# Patient Record
Sex: MALE | Race: WHITE | Employment: UNEMPLOYED | RURAL
[De-identification: names, ages, dates, MRNs, and addresses within clinical notes are randomized per-mention and may not be internally consistent; named-entity substitution may affect disease eponyms.]

---
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
## 2020-04-29 ENCOUNTER — TELEPHONE (OUTPATIENT)
Dept: FAMILY MEDICINE CLINIC | Age: 20
End: 2020-04-29

## 2020-04-29 NOTE — TELEPHONE ENCOUNTER
Pt wants medication Venlasasine 50mg not on reorder list but wants sent over to [facility] in [address] [facility] [address]    Call pt at [phone]

## 2020-04-30 ENCOUNTER — VIRTUAL VISIT (OUTPATIENT)
Dept: FAMILY MEDICINE CLINIC | Age: 20
End: 2020-04-30

## 2020-04-30 DIAGNOSIS — F33.41 RECURRENT MAJOR DEPRESSIVE DISORDER, IN PARTIAL REMISSION (HCC): Primary | ICD-10-CM

## 2020-04-30 RX ORDER — VENLAFAXINE 50 MG/1
50 TABLET ORAL DAILY
Qty: 90 TAB | Refills: 1 | Status: SHIPPED | OUTPATIENT
Start: 2020-04-30 | End: 2020-12-29 | Stop reason: SDUPTHER

## 2020-04-30 NOTE — PROGRESS NOTES
CC:  Chief Complaint   Patient presents with    Medication Refill     Currently on Effexor and this has been working really well for him. Depression symptoms are better controlled at this time. Dominique Miramontes is a 21 y.o. male who was seen by synchronous (real-time) audio-video technology on 4/30/2020. Consent: Dominique Miramontes, who was seen by synchronous (real-time) audio-video technology, and/or his healthcare decision maker, is aware that this patient-initiated, Telehealth encounter on 4/30/2020 is a billable service, with coverage as determined by his insurance carrier. He is aware that he may receive a bill and has provided verbal consent to proceed: Yes. Assessment & Plan:   Diagnoses and all orders for this visit:    1. Recurrent major depressive disorder, in partial remission (HCC)  -     venlafaxine (EFFEXOR) 50 mg tablet; Take 1 Tab by mouth daily for 90 days. I spent at least 23 minutes on this visit with this established patient. ([id number])    Subjective:   Dominique Miramontes is a 21 y.o. male who was seen for Medication Refill (Currently on Effexor and this has been working really well for him. Depression symptoms are better controlled at this time. )      Prior to Admission medications    Medication Sig Start Date End Date Taking? Authorizing Provider   venlafaxine (EFFEXOR) 50 mg tablet Take 1 Tab by mouth daily for 90 days.  4/30/20 7/29/20 Yes Pratik Bravo MD     No Known Allergies    Patient Active Problem List   Diagnosis Code    Vitamin D deficiency E55.9     No Known Allergies  Past Medical History:   Diagnosis Date    Otitis media     Respiratory abnormalities     Tick bite summer 2010    Vitamin D deficiency 5/22/2019     Past Surgical History:   Procedure Laterality Date    HX CIRCUMCISION      HX WISDOM TEETH EXTRACTION       Family History   Problem Relation Age of Onset    Hypertension Brother     Diabetes Maternal Grandmother     Elevated Lipids Maternal Grandmother     Elevated Lipids Maternal Grandfather     Diabetes Paternal Grandmother      Social History     Tobacco Use    Smoking status: Never Smoker    Smokeless tobacco: Never Used   Substance Use Topics    Alcohol use: No       Review of Systems   Psychiatric/Behavioral: Positive for depression. Negative for hallucinations and suicidal ideas. The patient is not nervous/anxious. All other systems reviewed and are negative. Objective:   Vital Signs: (As obtained by patient/caregiver at home)  There were no vitals taken for this visit. General: alert, cooperative, no distress   Mental  status: normal mood, behavior, speech, dress, motor activity, and thought processes, able to follow commands   HENT: NCAT   Neck: no visualized mass   Resp: no respiratory distress   Neuro: no gross deficits   Skin: no discoloration or lesions of concern on visible areas   Psychiatric: normal affect, consistent with stated mood, no evidence of hallucinations     We discussed the expected course, resolution and complications of the diagnosis(es) in detail. Medication risks, benefits, costs, interactions, and alternatives were discussed as indicated. I advised him to contact the office if his condition worsens, changes or fails to improve as anticipated. He expressed understanding with the diagnosis(es) and plan. Naty Barraza is a 21 y.o. male who was evaluated by a video visit encounter for concerns as above. Patient identification was verified prior to start of the visit. A caregiver was present when appropriate. Due to this being a TeleHealth encounter (During [place] [address] public health emergency), evaluation of the following organ systems was limited: Vitals/Constitutional/EENT/Resp/CV/GI//MS/Neuro/Skin/Heme-Lymph-Imm.   Pursuant to the emergency declaration under the [facility] [address] waiver authority and the [place] Resources and Response Supplemental Appropriations Act, this Virtual  Visit was conducted, with patient's (and/or legal guardian's) consent, to reduce the patient's risk of exposure to COVID-19 and provide necessary medical care. Services were provided through a video synchronous discussion virtually to substitute for in-person clinic visit. Patient and provider were located at their individual homes. Follow-up and Dispositions    · Return in about 6 months (around 10/30/2020), or if symptoms worsen or fail to improve.        Salvatore Gonzalez MD

## 2020-12-29 ENCOUNTER — VIRTUAL VISIT (OUTPATIENT)
Dept: FAMILY MEDICINE CLINIC | Age: 20
End: 2020-12-29
Payer: COMMERCIAL

## 2020-12-29 DIAGNOSIS — F33.41 RECURRENT MAJOR DEPRESSIVE DISORDER, IN PARTIAL REMISSION (HCC): ICD-10-CM

## 2020-12-29 PROCEDURE — 99442 PR PHYS/QHP TELEPHONE EVALUATION 11-20 MIN: CPT | Performed by: INTERNAL MEDICINE

## 2020-12-29 RX ORDER — VENLAFAXINE 50 MG/1
50 TABLET ORAL DAILY
Qty: 90 TAB | Refills: 1 | Status: SHIPPED | OUTPATIENT
Start: 2020-12-29 | End: 2021-10-01

## 2020-12-29 NOTE — PROGRESS NOTES
Chief Complaint   Patient presents with    Follow-up    Depression         Ya Stevens is a 21 y.o. male who was seen by synchronous (real-time) AUDIO technology on 12/29/2020 for Follow-up and Depression        Assessment & Plan:   Diagnoses and all orders for this visit:    1. Recurrent major depressive disorder, in partial remission (HCC)  -     venlafaxine (EFFEXOR) 50 mg tablet; Take 1 Tab by mouth daily for 90 days. I spent at least 15 minutes on this visit with this established patient. Subjective:   20M with h/o depression who presents for follow up and refill of Effexor. He has found the medication helpful and useful and feels like his depression is better controlled. He is living with his mother. He has plans of maybe going to the [employer] or [employer] in the next year. He denies any worsening depression symptoms, SI/SI/SA. No worrisome side effects from the medication. Patient Active Problem List    Diagnosis Date Noted    Vitamin D deficiency 05/22/2019     Current Outpatient Medications   Medication Sig Dispense Refill    venlafaxine (EFFEXOR) 50 mg tablet Take 1 Tab by mouth daily for 90 days. 90 Tab 1     No Known Allergies  Past Medical History:   Diagnosis Date    Otitis media     Respiratory abnormalities     Tick bite summer 2010    Vitamin D deficiency 5/22/2019     Past Surgical History:   Procedure Laterality Date    HX CIRCUMCISION      HX WISDOM TEETH EXTRACTION       Family History   Problem Relation Age of Onset    Hypertension Brother     Diabetes Maternal Grandmother     Elevated Lipids Maternal Grandmother     Elevated Lipids Maternal Grandfather     Diabetes Paternal Grandmother      Social History     Tobacco Use    Smoking status: Never Smoker    Smokeless tobacco: Never Used   Substance Use Topics    Alcohol use: No       Review of Systems   Constitutional: Negative. HENT: Negative. Eyes: Negative. Respiratory: Negative. Cardiovascular: Negative. Gastrointestinal: Negative. Genitourinary: Negative. Musculoskeletal: Negative. Skin: Negative. Neurological: Negative. Endo/Heme/Allergies: Negative. Psychiatric/Behavioral: Negative. All other systems reviewed and are negative. Objective: There were no vitals taken for this visit. General: alert, cooperative, no distress   Mental  status: normal mood, behavior, speech thought processes, able to follow commands   Psychiatric: flat affect, consistent with stated mood, no evidence of hallucinations       We discussed the expected course, resolution and complications of the diagnosis(es) in detail. Medication risks, benefits, costs, interactions, and alternatives were discussed as indicated. I advised him to contact the office if his condition worsens, changes or fails to improve as anticipated. He expressed understanding with the diagnosis(es) and plan. Luciana Dewitt, who was evaluated through a patient-initiated, synchronous (real-time) audio-video encounter, and/or his healthcare decision maker, is aware that it is a billable service, with coverage as determined by his insurance carrier. He provided verbal consent to proceed: Yes, and patient identification was verified. It was conducted pursuant to the emergency declaration under the [address] [place] [address], [address] [place] [address] authority and the [employer] Act. A caregiver was present when appropriate. Ability to conduct physical exam was limited. I was in the office. The patient was at home. Follow-up and Dispositions    · Return if symptoms worsen or fail to improve, for Follow up 6 months for chronic issues, Follow up depression.           Jag Oreilly MD

## 2021-04-15 ENCOUNTER — APPOINTMENT (OUTPATIENT)
Dept: GENERAL RADIOLOGY | Age: 21
End: 2021-04-15
Attending: EMERGENCY MEDICINE
Payer: COMMERCIAL

## 2021-04-15 ENCOUNTER — HOSPITAL ENCOUNTER (EMERGENCY)
Age: 21
Discharge: HOME | End: 2021-04-15
Attending: EMERGENCY MEDICINE
Payer: COMMERCIAL

## 2021-04-15 VITALS
TEMPERATURE: 100.7 F | HEIGHT: 69 IN | HEART RATE: 119 BPM | OXYGEN SATURATION: 96 % | WEIGHT: 191.36 LBS | RESPIRATION RATE: 16 BRPM | DIASTOLIC BLOOD PRESSURE: 58 MMHG | SYSTOLIC BLOOD PRESSURE: 113 MMHG | BODY MASS INDEX: 28.34 KG/M2

## 2021-04-15 DIAGNOSIS — R50.83 FEVER AFTER COVID-19 VACCINATION: Primary | ICD-10-CM

## 2021-04-15 DIAGNOSIS — R07.9 CHEST PAIN, UNSPECIFIED TYPE: ICD-10-CM

## 2021-04-15 DIAGNOSIS — T50.B95A FEVER AFTER COVID-19 VACCINATION: Primary | ICD-10-CM

## 2021-04-15 LAB
ALBUMIN SERPL-MCNC: 3.9 G/DL (ref 3.5–5)
ALBUMIN/GLOB SERPL: 1.2 {RATIO} (ref 1.1–2.2)
ALP SERPL-CCNC: 97 U/L (ref 45–117)
ALT SERPL-CCNC: 34 U/L (ref 12–78)
ANION GAP SERPL CALC-SCNC: 9 MMOL/L (ref 5–15)
AST SERPL-CCNC: 22 U/L (ref 15–37)
BASOPHILS # BLD: 0 K/UL (ref 0–0.1)
BASOPHILS NFR BLD: 0 % (ref 0–1)
BILIRUB SERPL-MCNC: 0.6 MG/DL (ref 0.2–1)
BUN SERPL-MCNC: 13 MG/DL (ref 6–20)
BUN/CREAT SERPL: 14 (ref 12–20)
CALCIUM SERPL-MCNC: 9.1 MG/DL (ref 8.5–10.1)
CHLORIDE SERPL-SCNC: 101 MMOL/L (ref 97–108)
CO2 SERPL-SCNC: 27 MMOL/L (ref 21–32)
CREAT SERPL-MCNC: 0.9 MG/DL (ref 0.7–1.3)
DIFFERENTIAL METHOD BLD: NORMAL
EOSINOPHIL # BLD: 0 K/UL (ref 0–0.4)
EOSINOPHIL NFR BLD: 0 % (ref 0–7)
ERYTHROCYTE [DISTWIDTH] IN BLOOD BY AUTOMATED COUNT: 12.2 % (ref 11.5–14.5)
GLOBULIN SER CALC-MCNC: 3.2 G/DL (ref 2–4)
GLUCOSE SERPL-MCNC: 104 MG/DL (ref 65–100)
HCT VFR BLD AUTO: 42 % (ref 36.6–50.3)
HGB BLD-MCNC: 14.2 G/DL (ref 12.1–17)
IMM GRANULOCYTES # BLD AUTO: 0 K/UL
IMM GRANULOCYTES NFR BLD AUTO: 0 %
LYMPHOCYTES # BLD: 0.8 K/UL (ref 0.8–3.5)
LYMPHOCYTES NFR BLD: 13 % (ref 12–49)
MCH RBC QN AUTO: 30.8 PG (ref 26–34)
MCHC RBC AUTO-ENTMCNC: 33.8 G/DL (ref 30–36.5)
MCV RBC AUTO: 91.1 FL (ref 80–99)
MONOCYTES # BLD: 0.7 K/UL (ref 0–1)
MONOCYTES NFR BLD: 11 % (ref 5–13)
NEUTS BAND NFR BLD MANUAL: 1 % (ref 0–6)
NEUTS SEG # BLD: 4.6 K/UL (ref 1.8–8)
NEUTS SEG NFR BLD: 75 % (ref 32–75)
NRBC # BLD: 0 K/UL (ref 0–0.01)
NRBC BLD-RTO: 0 PER 100 WBC
PLATELET # BLD AUTO: 181 K/UL (ref 150–400)
PMV BLD AUTO: 9.4 FL (ref 8.9–12.9)
POTASSIUM SERPL-SCNC: 3.9 MMOL/L (ref 3.5–5.1)
PROT SERPL-MCNC: 7.1 G/DL (ref 6.4–8.2)
RBC # BLD AUTO: 4.61 M/UL (ref 4.1–5.7)
RBC MORPH BLD: NORMAL
SARS-COV-2, COV2: NORMAL
SODIUM SERPL-SCNC: 137 MMOL/L (ref 136–145)
TROPONIN I SERPL-MCNC: <0.05 NG/ML
WBC # BLD AUTO: 6.1 K/UL (ref 4.1–11.1)
WBC MORPH BLD: NORMAL

## 2021-04-15 PROCEDURE — 36415 COLL VENOUS BLD VENIPUNCTURE: CPT

## 2021-04-15 PROCEDURE — 84484 ASSAY OF TROPONIN QUANT: CPT

## 2021-04-15 PROCEDURE — 71045 X-RAY EXAM CHEST 1 VIEW: CPT

## 2021-04-15 PROCEDURE — 93005 ELECTROCARDIOGRAM TRACING: CPT

## 2021-04-15 PROCEDURE — U0003 INFECTIOUS AGENT DETECTION BY NUCLEIC ACID (DNA OR RNA); SEVERE ACUTE RESPIRATORY SYNDROME CORONAVIRUS 2 (SARS-COV-2) (CORONAVIRUS DISEASE [COVID-19]), AMPLIFIED PROBE TECHNIQUE, MAKING USE OF HIGH THROUGHPUT TECHNOLOGIES AS DESCRIBED BY CMS-2020-01-R: HCPCS

## 2021-04-15 PROCEDURE — 99285 EMERGENCY DEPT VISIT HI MDM: CPT

## 2021-04-15 PROCEDURE — 74011250637 HC RX REV CODE- 250/637: Performed by: EMERGENCY MEDICINE

## 2021-04-15 PROCEDURE — 85025 COMPLETE CBC W/AUTO DIFF WBC: CPT

## 2021-04-15 PROCEDURE — 80053 COMPREHEN METABOLIC PANEL: CPT

## 2021-04-15 PROCEDURE — 74011250636 HC RX REV CODE- 250/636: Performed by: EMERGENCY MEDICINE

## 2021-04-15 RX ORDER — DIPHENHYDRAMINE HCL 25 MG
50 TABLET ORAL
COMMUNITY

## 2021-04-15 RX ORDER — ACETAMINOPHEN 500 MG
1000 TABLET ORAL ONCE
Status: COMPLETED | OUTPATIENT
Start: 2021-04-15 | End: 2021-04-15

## 2021-04-15 RX ORDER — IBUPROFEN 600 MG/1
600 TABLET ORAL
Status: COMPLETED | OUTPATIENT
Start: 2021-04-15 | End: 2021-04-15

## 2021-04-15 RX ADMIN — IBUPROFEN 600 MG: 600 TABLET, FILM COATED ORAL at 17:57

## 2021-04-15 RX ADMIN — ACETAMINOPHEN 1000 MG: 500 TABLET, FILM COATED ORAL at 17:57

## 2021-04-15 RX ADMIN — SODIUM CHLORIDE 1000 ML: 9 INJECTION, SOLUTION INTRAVENOUS at 18:01

## 2021-04-15 NOTE — ED TRIAGE NOTES
Patient presents to the ER with complaints of chest pain that began at 0800. He states the \"severe pain has been intermittent but the tightness he has had throughout the day\". Patient states he got his second covid shot yesterday. States he feels like he has a fever.

## 2021-04-15 NOTE — ED PROVIDER NOTES
80-year-old male with a history of generalized anxiety disorder and depression on Effexor had his second dose of the Pfizer Covid vaccine yesterday and noticed some arm soreness and a headache last night. He woke up with chest pain, fever, nausea and vomiting. Has had a mild cough every now and then for the last few days, no change. No shortness of breath currently. He felt well before the vaccine. He has not taken any medications today. He also feels fatigued. No known Covid exposure. No loss of taste or smell. Past Medical History:   Diagnosis Date    Generalized anxiety disorder     Otitis media     Respiratory abnormalities     Severe depression (HCC)     Tick bite summer 2010    Vitamin D deficiency 5/22/2019       Past Surgical History:   Procedure Laterality Date    HX CIRCUMCISION      HX WISDOM TEETH EXTRACTION           Family History:   Problem Relation Age of Onset    Hypertension Brother     Diabetes Maternal Grandmother     Elevated Lipids Maternal Grandmother     Elevated Lipids Maternal Grandfather     Diabetes Paternal Grandmother        Social History     Socioeconomic History    Marital status: SINGLE     Spouse name: Not on file    Number of children: Not on file    Years of education: Not on file    Highest education level: Not on file   Occupational History    Not on file   Social Needs    Financial resource strain: Not on file    Food insecurity     Worry: Not on file     Inability: Not on file    Transportation needs     Medical: Not on file     Non-medical: Not on file   Tobacco Use    Smoking status: Never Smoker    Smokeless tobacco: Never Used   Substance and Sexual Activity    Alcohol use:  Yes    Drug use: Never    Sexual activity: Yes     Partners: Female     Birth control/protection: None   Lifestyle    Physical activity     Days per week: Not on file     Minutes per session: Not on file    Stress: Not on file   Relationships    Social connections     Talks on phone: Not on file     Gets together: Not on file     Attends Jainism service: Not on file     Active member of club or organization: Not on file     Attends meetings of clubs or organizations: Not on file     Relationship status: Not on file    Intimate partner violence     Fear of current or ex partner: Not on file     Emotionally abused: Not on file     Physically abused: Not on file     Forced sexual activity: Not on file   Other Topics Concern    Not on file   Social History Narrative    Not on file         ALLERGIES: Patient has no known allergies. Review of Systems   Constitutional: Positive for fatigue and fever. HENT: Positive for sore throat. Negative for trouble swallowing. Eyes: Negative for visual disturbance. Respiratory: Positive for cough. Cardiovascular: Positive for chest pain. Gastrointestinal: Positive for nausea and vomiting. Negative for abdominal pain and diarrhea. Genitourinary: Negative for difficulty urinating. Musculoskeletal: Negative for gait problem. Skin: Negative for rash. Neurological: Negative for headaches. Hematological: Does not bruise/bleed easily. Psychiatric/Behavioral: Negative for sleep disturbance. Vitals:    04/15/21 1707   BP: 127/71   Pulse: (!) 119   Resp: 16   Temp: (!) 100.7 °F (38.2 °C)   SpO2: 97%   Weight: 86.8 kg (191 lb 5.8 oz)   Height: 5' 9\" (1.753 m)            Physical Exam  Constitutional:       General: He is not in acute distress. Appearance: Normal appearance. He is ill-appearing. He is not toxic-appearing or diaphoretic. HENT:      Head: Normocephalic. Nose: Nose normal.      Mouth/Throat:      Mouth: Mucous membranes are moist.   Eyes:      Extraocular Movements: Extraocular movements intact. Conjunctiva/sclera: Conjunctivae normal.   Cardiovascular:      Rate and Rhythm: Tachycardia present. Pulmonary:      Effort: Pulmonary effort is normal. No respiratory distress. Abdominal:      General: Abdomen is flat. Musculoskeletal: Normal range of motion. Skin:     Findings: No rash. Neurological:      General: No focal deficit present. Mental Status: He is alert. Psychiatric:         Behavior: Behavior normal.          MDM  Number of Diagnoses or Management Options  Diagnosis management comments: EKG at 1705  Sinus tachycardia with normal axis at a rate of 112 bpm  NM, QRS, and QTc all within normal limits  No significant ST changes    The most likely explanation for this constellation of symptoms is an immune response to the vaccination yesterday, but it is also possible he may have contracted Covid just prior to being vaccinated and is now showing symptoms. I told him we would check for any Covid complications, like myocarditis, pneumonia, etc.  If negative, I will test him for Covid and have him go home and rest and take Tylenol and Motrin as needed. If the symptoms clear up in the next day or 2 then is this is most likely just an immune response, but if they continue or if he has a positive Covid test, then I made it clear to him that the symptoms could get worse and he would need to return should his chest pain get worse or should he develop shortness of breath or other concerning symptoms. This does not seem consistent with acute coronary syndrome. I do not believe he has a PE based on his lack of shortness of breath and based on the fact that the symptoms sprung up 1 day after vaccination.          Procedures

## 2021-04-16 ENCOUNTER — PATIENT OUTREACH (OUTPATIENT)
Dept: FAMILY MEDICINE CLINIC | Age: 21
End: 2021-04-16

## 2021-04-16 LAB
SARS-COV-2, XPLCVT: NOT DETECTED
SOURCE, COVRS: NORMAL

## 2021-04-17 LAB
ATRIAL RATE: 112 BPM
CALCULATED P AXIS, ECG09: 64 DEGREES
CALCULATED R AXIS, ECG10: 78 DEGREES
CALCULATED T AXIS, ECG11: 38 DEGREES
DIAGNOSIS, 93000: NORMAL
P-R INTERVAL, ECG05: 122 MS
Q-T INTERVAL, ECG07: 306 MS
QRS DURATION, ECG06: 88 MS
QTC CALCULATION (BEZET), ECG08: 417 MS
VENTRICULAR RATE, ECG03: 112 BPM

## 2021-09-30 DIAGNOSIS — F33.41 RECURRENT MAJOR DEPRESSIVE DISORDER, IN PARTIAL REMISSION (HCC): ICD-10-CM

## 2021-10-01 RX ORDER — VENLAFAXINE 50 MG/1
TABLET ORAL
Qty: 90 TABLET | Refills: 0 | Status: SHIPPED | OUTPATIENT
Start: 2021-10-01

## 2022-03-08 ENCOUNTER — TELEPHONE (OUTPATIENT)
Dept: FAMILY MEDICINE CLINIC | Age: 22
End: 2022-03-08

## 2022-03-19 PROBLEM — E55.9 VITAMIN D DEFICIENCY: Status: ACTIVE | Noted: 2019-05-22

## 2023-01-05 ENCOUNTER — APPOINTMENT (OUTPATIENT)
Dept: FAMILY MEDICINE CLINIC | Age: 23
End: 2023-01-05

## 2023-01-05 ENCOUNTER — OFFICE VISIT (OUTPATIENT)
Dept: FAMILY MEDICINE CLINIC | Age: 23
End: 2023-01-05
Payer: COMMERCIAL

## 2023-01-05 VITALS
OXYGEN SATURATION: 98 % | WEIGHT: 175.4 LBS | DIASTOLIC BLOOD PRESSURE: 72 MMHG | BODY MASS INDEX: 25.11 KG/M2 | TEMPERATURE: 98.9 F | HEIGHT: 70 IN | RESPIRATION RATE: 18 BRPM | SYSTOLIC BLOOD PRESSURE: 138 MMHG | HEART RATE: 108 BPM

## 2023-01-05 DIAGNOSIS — F41.1 GENERALIZED ANXIETY DISORDER: Primary | ICD-10-CM

## 2023-01-05 DIAGNOSIS — E78.2 MIXED HYPERLIPIDEMIA: ICD-10-CM

## 2023-01-05 DIAGNOSIS — F51.04 PSYCHOPHYSIOLOGICAL INSOMNIA: ICD-10-CM

## 2023-01-05 DIAGNOSIS — R53.81 MALAISE AND FATIGUE: ICD-10-CM

## 2023-01-05 DIAGNOSIS — E55.9 VITAMIN D DEFICIENCY: ICD-10-CM

## 2023-01-05 DIAGNOSIS — R53.83 MALAISE AND FATIGUE: ICD-10-CM

## 2023-01-05 PROCEDURE — 99213 OFFICE O/P EST LOW 20 MIN: CPT | Performed by: INTERNAL MEDICINE

## 2023-01-05 RX ORDER — BUSPIRONE HYDROCHLORIDE 5 MG/1
5 TABLET ORAL 2 TIMES DAILY
Qty: 60 TABLET | Refills: 0 | Status: SHIPPED | OUTPATIENT
Start: 2023-01-05

## 2023-01-05 NOTE — PROGRESS NOTES
Identified pt with two pt identifiers(name and ). Chief Complaint   Patient presents with    Anxiety     Patient has been having what feels like panic attacks for about a year. Health Maintenance Due   Topic    Hepatitis C Screening     Depression Screen     COVID-19 Vaccine (1)    DTaP/Tdap/Td series (1 - Tdap)    Flu Vaccine (1)       Wt Readings from Last 3 Encounters:   23 175 lb 6.4 oz (79.6 kg)   04/15/21 191 lb 5.8 oz (86.8 kg)   19 142 lb 9.6 oz (64.7 kg) (32 %, Z= -0.48)*     * Growth percentiles are based on CDC (Boys, 2-20 Years) data. Temp Readings from Last 3 Encounters:   23 98.9 °F (37.2 °C) (Temporal)   04/15/21 (!) 100.7 °F (38.2 °C)   19 98.2 °F (36.8 °C) (Oral)     BP Readings from Last 3 Encounters:   23 138/72   04/15/21 (!) 113/58   19 108/50     Pulse Readings from Last 3 Encounters:   23 (!) 108   04/15/21 (!) 119   19 91         Learning Assessment:  :     Learning Assessment 2019   PRIMARY LEARNER Patient   HIGHEST LEVEL OF EDUCATION - PRIMARY LEARNER  SOME COLLEGE   PRIMARY LANGUAGE ENGLISH   LEARNER PREFERENCE PRIMARY DEMONSTRATION   ANSWERED BY patient   RELATIONSHIP SELF       Depression Screening:  :     3 most recent PHQ Screens 2023   Little interest or pleasure in doing things Not at all   Feeling down, depressed, irritable, or hopeless Not at all   Total Score PHQ 2 0       Fall Risk Assessment:  :     No flowsheet data found. Abuse Screening:  :     Abuse Screening Questionnaire 2023   Do you ever feel afraid of your partner? N N   Are you in a relationship with someone who physically or mentally threatens you? N N   Is it safe for you to go home?  Y Y       Coordination of Care Questionnaire:  :     1) Have you been to an emergency room, urgent care clinic since your last visit? no   Hospitalized since your last visit? no             2) Have you seen or consulted any other health care providers outside of [address] [place] [address] since your last visit? no  (Include any pap smears or colon screenings in this section.)    3) Do you have an Advance Directive on file? no  Are you interested in receiving information about Advance Directives? no    Patient is accompanied by self I have received verbal consent from Roddy Reed to discuss any/all medical information while they are present in the room. 4.  For patients aged 39-70: Has the patient had a colonoscopy / FIT/ Cologuard? NA - based on age      If the patient is female:    11. For patients aged 41-77: Has the patient had a mammogram within the past 2 years? NA - based on age or sex      10. For patients aged 21-65: Has the patient had a pap smear?  NA - based on age or sex

## 2023-01-06 LAB
25(OH)D3+25(OH)D2 SERPL-MCNC: 8 NG/ML (ref 30–100)
ALBUMIN SERPL-MCNC: 4.8 G/DL (ref 4.1–5.2)
ALBUMIN/GLOB SERPL: 2 {RATIO} (ref 1.2–2.2)
ALP SERPL-CCNC: 117 IU/L (ref 44–121)
ALT SERPL-CCNC: 19 IU/L (ref 0–44)
AST SERPL-CCNC: 22 IU/L (ref 0–40)
BASOPHILS # BLD AUTO: 0 X10E3/UL (ref 0–0.2)
BASOPHILS NFR BLD AUTO: 1 %
BILIRUB SERPL-MCNC: 0.4 MG/DL (ref 0–1.2)
BUN SERPL-MCNC: 15 MG/DL (ref 6–20)
BUN/CREAT SERPL: 17 (ref 9–20)
CALCIUM SERPL-MCNC: 9.7 MG/DL (ref 8.7–10.2)
CHLORIDE SERPL-SCNC: 101 MMOL/L (ref 96–106)
CHOLEST SERPL-MCNC: 217 MG/DL (ref 100–199)
CO2 SERPL-SCNC: 24 MMOL/L (ref 20–29)
CREAT SERPL-MCNC: 0.86 MG/DL (ref 0.76–1.27)
EGFR: 126 ML/MIN/1.73
EOSINOPHIL # BLD AUTO: 0.1 X10E3/UL (ref 0–0.4)
EOSINOPHIL NFR BLD AUTO: 2 %
ERYTHROCYTE [DISTWIDTH] IN BLOOD BY AUTOMATED COUNT: 12.1 % (ref 11.6–15.4)
GLOBULIN SER CALC-MCNC: 2.4 G/DL (ref 1.5–4.5)
GLUCOSE SERPL-MCNC: 109 MG/DL (ref 70–99)
HCT VFR BLD AUTO: 48.3 % (ref 37.5–51)
HDLC SERPL-MCNC: 53 MG/DL
HGB BLD-MCNC: 16.2 G/DL (ref 13–17.7)
IMM GRANULOCYTES # BLD AUTO: 0 X10E3/UL (ref 0–0.1)
IMM GRANULOCYTES NFR BLD AUTO: 0 %
IMP & REVIEW OF LAB RESULTS: NORMAL
LDLC SERPL CALC-MCNC: 133 MG/DL (ref 0–99)
LYMPHOCYTES # BLD AUTO: 1.2 X10E3/UL (ref 0.7–3.1)
LYMPHOCYTES NFR BLD AUTO: 33 %
MCH RBC QN AUTO: 30.6 PG (ref 26.6–33)
MCHC RBC AUTO-ENTMCNC: 33.5 G/DL (ref 31.5–35.7)
MCV RBC AUTO: 91 FL (ref 79–97)
MONOCYTES # BLD AUTO: 0.3 X10E3/UL (ref 0.1–0.9)
MONOCYTES NFR BLD AUTO: 9 %
NEUTROPHILS # BLD AUTO: 2.1 X10E3/UL (ref 1.4–7)
NEUTROPHILS NFR BLD AUTO: 55 %
PLATELET # BLD AUTO: 241 X10E3/UL (ref 150–450)
POTASSIUM SERPL-SCNC: 4.4 MMOL/L (ref 3.5–5.2)
PROT SERPL-MCNC: 7.2 G/DL (ref 6–8.5)
RBC # BLD AUTO: 5.3 X10E6/UL (ref 4.14–5.8)
SODIUM SERPL-SCNC: 141 MMOL/L (ref 134–144)
TRIGL SERPL-MCNC: 173 MG/DL (ref 0–149)
TSH SERPL DL<=0.005 MIU/L-ACNC: 1.17 UIU/ML (ref 0.45–4.5)
VLDLC SERPL CALC-MCNC: 31 MG/DL (ref 5–40)
WBC # BLD AUTO: 3.8 X10E3/UL (ref 3.4–10.8)

## 2023-01-09 NOTE — PROGRESS NOTES
Chief Complaint   Patient presents with    Anxiety     Patient has been having what feels like panic attacks for about a year. Assessment/ Plan:   Diagnoses and all orders for this visit:    1. Generalized anxiety disorder  -     busPIRone (BUSPAR) 5 mg tablet; Take 1 Tablet by mouth two (2) times a day. 2. Psychophysiological insomnia   Will continue to monitor. He will be seeing a psychiatrist on 1/24/2023.     3. Mixed hyperlipidemia  -     METABOLIC PANEL, COMPREHENSIVE; Future  -     CBC WITH AUTOMATED DIFF; Future  -     LIPID PANEL; Future    4. Vitamin D deficiency  -     VITAMIN D, 25 HYDROXY; Future    5. Malaise and fatigue  -     THYROID CASCADE PROFILE; Future    I have discussed the diagnosis with the patient and the intended treatment plan as seen in the above orders. The patient has received an after-visit summary and questions were answered concerning future plans. Asked to return should symptoms worsen or not improve with treatment. Any pending labs and studies will be relayed to patient when they become available. Pt verbalizes understanding of plan of care and denies further questions or concerns at this time. Follow-up and Dispositions    Return in about 3 weeks (around 1/26/2023), or if symptoms worsen or fail to improve. Subjective:   Jayson Garner is a 25 y.o. male who presents today with c/o worsening anxiety. I recall that we had been treating him a few years ago for depression. We settled on Effexor which was working well for him. He did transfter to [facility]) but then dropped out. He is now working at [employer]. He has no plans to return to school at this time. Perhaps later. He denies SI/HI/SA. He has been off of medications for some time. He does have an appointment scheduled with a psychiatrist on 1/24/2023. HISTORICAL  PMH, PSH, FHX, SOCHX, ALLERGIES and MES were reviewed and updated today.       Review of Systems  Review of Systems   Psychiatric/Behavioral:  Positive for depression. The patient is nervous/anxious and has insomnia. All other systems reviewed and are negative. Objective:     Vitals:    01/05/23 1304   BP: 138/72   Pulse: (!) 108   Resp: 18   Temp: 98.9 °F (37.2 °C)   TempSrc: Temporal   SpO2: 98%   Weight: 175 lb 6.4 oz (79.6 kg)   Height: 5' 10\" (1.778 m)       Physical Exam  Vitals and nursing note reviewed. Constitutional:       Appearance: Normal appearance. HENT:      Head: Normocephalic and atraumatic. Mouth/Throat:      Mouth: Mucous membranes are moist.   Eyes:      Extraocular Movements: Extraocular movements intact. Conjunctiva/sclera: Conjunctivae normal.      Pupils: Pupils are equal, round, and reactive to light. Cardiovascular:      Rate and Rhythm: Normal rate and regular rhythm. Pulses: Normal pulses. Heart sounds: Normal heart sounds. Pulmonary:      Effort: Pulmonary effort is normal.      Breath sounds: Normal breath sounds. Musculoskeletal:      Cervical back: Normal range of motion and neck supple. Neurological:      General: No focal deficit present. Mental Status: He is alert. Mental status is at baseline. Cranial Nerves: No cranial nerve deficit. Sensory: No sensory deficit. Motor: No weakness. Coordination: Coordination normal.      Gait: Gait normal.      Deep Tendon Reflexes: Reflexes normal.   Psychiatric:         Mood and Affect: Mood normal.         Behavior: Behavior normal.         Thought Content:  Thought content normal.         Judgment: Judgment normal.       Alverto Armendariz MD  [address]   01/05/2023

## 2023-01-10 ENCOUNTER — TELEPHONE (OUTPATIENT)
Dept: FAMILY MEDICINE CLINIC | Age: 23
End: 2023-01-10

## 2023-01-10 NOTE — TELEPHONE ENCOUNTER
----- Message from Celena Manzano MD sent at 1/9/2023  8:42 PM EST -----  Pleas let patient know that his labs are stable/normal and show no worrisome concerns. He should consider taking Vitamin D 2000 international units daily (OTC). Cholesterol was a little elevated,but does not need medication. Work on low fat, low cholesterol foods. Follow up as discussed.

## 2023-01-10 NOTE — PROGRESS NOTES
Anshul let patient know that his labs are stable/normal and show no worrisome concerns. He should consider taking Vitamin D 2000 international units daily (OTC). Cholesterol was a little elevated,but does not need medication. Work on low fat, low cholesterol foods. Follow up as discussed.

## 2024-04-12 ENCOUNTER — OFFICE VISIT (OUTPATIENT)
Age: 24
End: 2024-04-12
Payer: COMMERCIAL

## 2024-04-12 VITALS
TEMPERATURE: 98.2 F | DIASTOLIC BLOOD PRESSURE: 60 MMHG | WEIGHT: 162.4 LBS | OXYGEN SATURATION: 99 % | HEIGHT: 70 IN | RESPIRATION RATE: 16 BRPM | BODY MASS INDEX: 23.25 KG/M2 | SYSTOLIC BLOOD PRESSURE: 116 MMHG | HEART RATE: 94 BPM

## 2024-04-12 DIAGNOSIS — Z13.220 SCREENING FOR LIPID DISORDERS: ICD-10-CM

## 2024-04-12 DIAGNOSIS — Z11.59 ENCOUNTER FOR HCV SCREENING TEST FOR LOW RISK PATIENT: ICD-10-CM

## 2024-04-12 DIAGNOSIS — Z23 ENCOUNTER FOR IMMUNIZATION: ICD-10-CM

## 2024-04-12 DIAGNOSIS — Z11.4 SCREENING FOR HIV (HUMAN IMMUNODEFICIENCY VIRUS): ICD-10-CM

## 2024-04-12 DIAGNOSIS — R22.31 SUBCUTANEOUS MASS OF RIGHT FOREARM: Primary | ICD-10-CM

## 2024-04-12 PROCEDURE — 90715 TDAP VACCINE 7 YRS/> IM: CPT | Performed by: FAMILY MEDICINE

## 2024-04-12 PROCEDURE — 99213 OFFICE O/P EST LOW 20 MIN: CPT | Performed by: FAMILY MEDICINE

## 2024-04-12 PROCEDURE — 90471 IMMUNIZATION ADMIN: CPT | Performed by: FAMILY MEDICINE

## 2024-04-12 SDOH — ECONOMIC STABILITY: FOOD INSECURITY: WITHIN THE PAST 12 MONTHS, YOU WORRIED THAT YOUR FOOD WOULD RUN OUT BEFORE YOU GOT MONEY TO BUY MORE.: NEVER TRUE

## 2024-04-12 SDOH — ECONOMIC STABILITY: HOUSING INSECURITY
IN THE LAST 12 MONTHS, WAS THERE A TIME WHEN YOU DID NOT HAVE A STEADY PLACE TO SLEEP OR SLEPT IN A SHELTER (INCLUDING NOW)?: NO

## 2024-04-12 SDOH — ECONOMIC STABILITY: FOOD INSECURITY: WITHIN THE PAST 12 MONTHS, THE FOOD YOU BOUGHT JUST DIDN'T LAST AND YOU DIDN'T HAVE MONEY TO GET MORE.: NEVER TRUE

## 2024-04-12 SDOH — ECONOMIC STABILITY: INCOME INSECURITY: HOW HARD IS IT FOR YOU TO PAY FOR THE VERY BASICS LIKE FOOD, HOUSING, MEDICAL CARE, AND HEATING?: NOT HARD AT ALL

## 2024-04-12 ASSESSMENT — PATIENT HEALTH QUESTIONNAIRE - PHQ9
SUM OF ALL RESPONSES TO PHQ QUESTIONS 1-9: 0
SUM OF ALL RESPONSES TO PHQ QUESTIONS 1-9: 0
SUM OF ALL RESPONSES TO PHQ9 QUESTIONS 1 & 2: 0
SUM OF ALL RESPONSES TO PHQ QUESTIONS 1-9: 0
2. FEELING DOWN, DEPRESSED OR HOPELESS: NOT AT ALL
SUM OF ALL RESPONSES TO PHQ QUESTIONS 1-9: 0
1. LITTLE INTEREST OR PLEASURE IN DOING THINGS: NOT AT ALL

## 2024-04-12 NOTE — PROGRESS NOTES
Identified pt with two pt identifiers(name and ).    Chief Complaint   Patient presents with    Mass     Pt is concerned about a bump on his right forearm.         Health Maintenance Due   Topic    Hepatitis B vaccine (1 of 3 - 3-dose series)    COVID-19 Vaccine (1)    Varicella vaccine (1 of 2 - 2-dose childhood series)    HPV vaccine (1 - Male 2-dose series)    HIV screen     Hepatitis C screen     DTaP/Tdap/Td vaccine (1 - Tdap)    Depression Screen        Wt Readings from Last 3 Encounters:   24 73.7 kg (162 lb 6.4 oz)   23 79.6 kg (175 lb 6.4 oz)   08/15/17 60.8 kg (134 lb) (31 %, Z= -0.50)*     * Growth percentiles are based on CDC (Boys, 2-20 Years) data.     Temp Readings from Last 3 Encounters:   24 98.2 °F (36.8 °C) (Temporal)     BP Readings from Last 3 Encounters:   24 116/60   23 138/72   08/15/17 124/68 (72 %, Z = 0.58 /  49 %, Z = -0.03)*     *BP percentiles are based on the 2017 AAP Clinical Practice Guideline for boys     Pulse Readings from Last 3 Encounters:   24 94   23 (!) 108   08/15/17 96           Depression Screening:  :         2024    11:45 AM 2023     1:00 PM   PHQ-9 Questionaire   Little interest or pleasure in doing things 0 0   Feeling down, depressed, or hopeless 0 0   PHQ-9 Total Score 0 0        Fall Risk Assessment:  :          No data to display                 Abuse Screening:  :          No data to display                 Coordination of Care Questionnaire:  :     \"Have you been to the ER, urgent care clinic since your last visit?  Hospitalized since your last visit?\"    NO    “Have you seen or consulted any other health care providers outside of [facility] since your last visit?”    NO

## 2024-04-12 NOTE — PROGRESS NOTES
[facility] [id number] [place] [address]  Phone: [phone]  Fax: [phone]        Chief Complaint   Patient presents with    Mass     Pt is concerned about a bump on his right forearm.      He is a 24 y.o. male who presents for acute visit.  Has a bump on his right forearm that has been there for about 1 year now.  Not painful, just notices it. Does not affect his function.  Does not get irritated, or itchy. Does not drain. No fever or systemic symptoms.    Prior to Visit Medications    Not on File       No Known Allergies      Reviewed PmHx, RxHx, FmHx, SocHx, AllgHx and updated and dated in the chart.      Objective:     Vitals:    04/12/24 1145   BP: 116/60   Site: Left Upper Arm   Position: Sitting   Cuff Size: Large Adult   Pulse: 94   Resp: 16   Temp: 98.2 °F (36.8 °C)   TempSrc: Temporal   SpO2: 99%   Weight: 73.7 kg (162 lb 6.4 oz)   Height: 1.778 m (5' 10\")     Physical Examination:    Physical Exam  Constitutional:       General: He is not in acute distress.     Appearance: Normal appearance.   Musculoskeletal:      Right forearm: Swelling (1cm cyst present on dorsal forearm.  Mobile. Not tender. No erythema.) present. No tenderness.   Neurological:      General: No focal deficit present.      Mental Status: He is alert and oriented to person, place, and time.      Cranial Nerves: No cranial nerve deficit.   Psychiatric:         Mood and Affect: Mood normal.         Behavior: Behavior normal.         No results found for this visit on 04/12/24.    Assessment/ Plan:   Jamar was seen today for mass.    Diagnoses and all orders for this visit:    Subcutaneous mass of right forearm    Encounter for immunization  -     Tdap, BOOSTRIX, (age 10 yrs+), IM    Encounter for HCV screening test for low risk patient  -     Hepatitis C Antibody; Future  -     Hepatitis C Antibody    Screening for HIV (human immunodeficiency virus)  -     HIV 1/2 Ag/Ab, 4TH Generation,W Rflx

## 2024-04-13 LAB
ALBUMIN SERPL-MCNC: 5 G/DL (ref 4.3–5.2)
ALBUMIN/GLOB SERPL: 2.3 {RATIO} (ref 1.2–2.2)
ALP SERPL-CCNC: 96 IU/L (ref 44–121)
ALT SERPL-CCNC: 26 IU/L (ref 0–44)
AST SERPL-CCNC: 29 IU/L (ref 0–40)
BILIRUB SERPL-MCNC: 0.5 MG/DL (ref 0–1.2)
BUN SERPL-MCNC: 19 MG/DL (ref 6–20)
BUN/CREAT SERPL: 24 (ref 9–20)
CALCIUM SERPL-MCNC: 10.2 MG/DL (ref 8.7–10.2)
CHLORIDE SERPL-SCNC: 104 MMOL/L (ref 96–106)
CHOLEST SERPL-MCNC: 200 MG/DL (ref 100–199)
CO2 SERPL-SCNC: 24 MMOL/L (ref 20–29)
CREAT SERPL-MCNC: 0.79 MG/DL (ref 0.76–1.27)
EGFRCR SERPLBLD CKD-EPI 2021: 127 ML/MIN/1.73
GLOBULIN SER CALC-MCNC: 2.2 G/DL (ref 1.5–4.5)
GLUCOSE SERPL-MCNC: 86 MG/DL (ref 70–99)
HCV IGG SERPL QL IA: NON REACTIVE
HDLC SERPL-MCNC: 53 MG/DL
HIV 1+2 AB+HIV1 P24 AG SERPL QL IA: NON REACTIVE
LDLC SERPL CALC-MCNC: 120 MG/DL (ref 0–99)
POTASSIUM SERPL-SCNC: 5.2 MMOL/L (ref 3.5–5.2)
PROT SERPL-MCNC: 7.2 G/DL (ref 6–8.5)
SODIUM SERPL-SCNC: 142 MMOL/L (ref 134–144)
TRIGL SERPL-MCNC: 155 MG/DL (ref 0–149)
VLDLC SERPL CALC-MCNC: 27 MG/DL (ref 5–40)

## 2024-04-14 LAB — IMP & REVIEW OF LAB RESULTS: NORMAL

## 2024-04-15 LAB — IMP & REVIEW OF LAB RESULTS: NORMAL

## 2024-07-07 ENCOUNTER — TELEPHONE (OUTPATIENT)
Facility: CLINIC | Age: 24
End: 2024-07-07

## 2024-07-07 NOTE — TELEPHONE ENCOUNTER
[facility] [place] [facility]     Received call from Jamar CARLOS Villaseñor at 6:03 PM     Subjective: Returned call to on-call number provided and reached patient's mother. She passed phone to patient.     Current Symptoms: Pt states he is having trouble breathing when going to sleep. He says sensation is difficult to describe but that it is \"annoying.\" He denies chest tightness or pain.      Onset: ongoing for a few months; however he just mentioned it to his mother who advised him to call.      Associated Symptoms: denies, possibly occurs more when stressed r/t work. Maybe sometimes alcohol is a trigger. Admits to anxiety symptoms and occasional panic attacks.      Pain Severity: none     Temperature:  none     What has been tried: nothing     Recommended disposition: As symptom has been ongoing x months, does not appear to be emergent/acute at this time. However he has not been seen in a few months and has no follow up on file with Dr. Foote. Discussed that patient should be seen to evaluate this symptom and discuss his anxiety/panic attacks. Advised pt to keep a log of his symptoms and that I will send a message to Dr. Foote to request an appointment on his behalf.     Patient agrees with plan, verbalizes understanding; denies any other questions or concerns; instructed to call back for any new or worsening symptoms.     Patient/Caller agrees with recommended disposition    FELIX Lazo - CNP  [address]  [address] [place] [address]  Tel: [phone]  Fax: [phone]

## 2024-07-10 ENCOUNTER — OFFICE VISIT (OUTPATIENT)
Age: 24
End: 2024-07-10
Payer: COMMERCIAL

## 2024-07-10 VITALS
HEART RATE: 75 BPM | HEIGHT: 70 IN | TEMPERATURE: 98.8 F | DIASTOLIC BLOOD PRESSURE: 67 MMHG | RESPIRATION RATE: 16 BRPM | WEIGHT: 159.2 LBS | BODY MASS INDEX: 22.79 KG/M2 | SYSTOLIC BLOOD PRESSURE: 139 MMHG | OXYGEN SATURATION: 98 %

## 2024-07-10 DIAGNOSIS — G47.8 SLEEP PARALYSIS: Primary | ICD-10-CM

## 2024-07-10 DIAGNOSIS — R06.89 GASPING FOR BREATH: ICD-10-CM

## 2024-07-10 PROCEDURE — 99213 OFFICE O/P EST LOW 20 MIN: CPT | Performed by: FAMILY MEDICINE

## 2024-07-10 RX ORDER — ERGOCALCIFEROL 1.25 MG/1
50000 CAPSULE ORAL WEEKLY
Qty: 4 CAPSULE | Refills: 1 | Status: SHIPPED | OUTPATIENT
Start: 2024-07-10

## 2024-07-10 NOTE — PROGRESS NOTES
Identified pt with two pt identifiers(name and ).    Chief Complaint   Patient presents with    Sleep Apnea     Patient would like to discuss sleep apnea as he has noticed right before he falls asleep he is having to catch his breath the past few months         Health Maintenance Due   Topic    Hepatitis B vaccine (1 of 3 - 3-dose series)    COVID-19 Vaccine (1)    HPV vaccine (1 - Male 2-dose series)    Varicella vaccine (2 of 2 - 2-dose childhood series)       Wt Readings from Last 3 Encounters:   24 73.7 kg (162 lb 6.4 oz)   23 79.6 kg (175 lb 6.4 oz)   08/15/17 60.8 kg (134 lb) (31 %, Z= -0.50)*     * Growth percentiles are based on CDC (Boys, 2-20 Years) data.     Temp Readings from Last 3 Encounters:   24 98.2 °F (36.8 °C) (Temporal)     BP Readings from Last 3 Encounters:   24 116/60   23 138/72   08/15/17 124/68 (72 %, Z = 0.58 /  49 %, Z = -0.03)*     *BP percentiles are based on the 2017 AAP Clinical Practice Guideline for boys     Pulse Readings from Last 3 Encounters:   24 94   23 (!) 108   08/15/17 96           Depression Screening:  :         2024    11:45 AM 2023     1:00 PM   PHQ-9 Questionaire   Little interest or pleasure in doing things 0 0   Feeling down, depressed, or hopeless 0 0   PHQ-9 Total Score 0 0        Fall Risk Assessment:  :          No data to display                 Abuse Screening:  :          No data to display                 Coordination of Care Questionnaire:  :     \"Have you been to the ER, urgent care clinic since your last visit?  Hospitalized since your last visit?\"    NO    “Have you seen or consulted any other health care providers outside of [facility] since your last visit?”    NO            Click Here for Release of Records Request

## 2024-07-11 ASSESSMENT — ENCOUNTER SYMPTOMS
SORE THROAT: 0
SHORTNESS OF BREATH: 0
EYE DISCHARGE: 0
COLOR CHANGE: 0
COUGH: 0
EYE ITCHING: 0
SINUS PAIN: 0
EYE PAIN: 0
CHEST TIGHTNESS: 0
BLOOD IN STOOL: 0
EYE REDNESS: 0
SINUS PRESSURE: 0
VOMITING: 0
NAUSEA: 0
RHINORRHEA: 0
CONSTIPATION: 0
DIARRHEA: 0
WHEEZING: 0
ABDOMINAL PAIN: 0

## 2024-07-11 NOTE — PROGRESS NOTES
Assessment & Plan   1. Sleep paralysis  -     [facility] - García Prabhakar MD, Sleep Medicine, [place]  -     vitamin D (ERGOCALCIFEROL) 1.25 MG (72360 UT) CAPS capsule; Take 1 capsule by mouth once a week, Disp-4 capsule, R-1Normal  2. Gasping for breath  -     [facility] - García Prabhakar MD, Sleep Medicine, [place]  -     vitamin D (ERGOCALCIFEROL) 1.25 MG (63134 UT) CAPS capsule; Take 1 capsule by mouth once a week, Disp-4 capsule, R-1Normal       No follow-ups on file.       Subjective   Jamar Villaseñor (:  2000) is a 24 y.o. male,Established patient, here for evaluation of the following chief complaint(s):  Sleep Apnea (Patient would like to discuss sleep apnea as he has noticed right before he falls asleep he is having to catch his breath the past few months )      Jamar Villaseñor is a 24 y.o. male presents with \"falling asleep and then gasping and waking up\"        Review of Systems   Constitutional:  Negative for activity change, appetite change, diaphoresis, fatigue, fever and unexpected weight change.   HENT:  Negative for congestion, nosebleeds, postnasal drip, rhinorrhea, sinus pressure, sinus pain, sneezing, sore throat and tinnitus.    Eyes:  Negative for pain, discharge, redness and itching.   Respiratory:  Negative for cough, chest tightness, shortness of breath and wheezing.    Cardiovascular:  Negative for chest pain, palpitations and leg swelling.   Gastrointestinal:  Negative for abdominal pain, blood in stool, constipation, diarrhea, nausea and vomiting.   Endocrine: Negative for cold intolerance, heat intolerance, polydipsia, polyphagia and polyuria.   Genitourinary:  Negative for decreased urine volume, dysuria, flank pain, frequency, hematuria and urgency.   Musculoskeletal:  Negative for arthralgias, gait problem, joint swelling, myalgias and neck stiffness.   Skin:  Negative for color change, rash and wound.   Allergic/Immunologic: Negative for environmental

## 2024-07-18 ENCOUNTER — TELEPHONE (OUTPATIENT)
Age: 24
End: 2024-07-18

## 2024-11-11 ENCOUNTER — OFFICE VISIT (OUTPATIENT)
Age: 24
End: 2024-11-11
Payer: COMMERCIAL

## 2024-11-11 VITALS
RESPIRATION RATE: 16 BRPM | HEIGHT: 70 IN | WEIGHT: 156 LBS | BODY MASS INDEX: 22.33 KG/M2 | OXYGEN SATURATION: 98 % | DIASTOLIC BLOOD PRESSURE: 64 MMHG | HEART RATE: 80 BPM | SYSTOLIC BLOOD PRESSURE: 134 MMHG | TEMPERATURE: 98 F

## 2024-11-11 DIAGNOSIS — G47.30 SLEEP-DISORDERED BREATHING: Primary | ICD-10-CM

## 2024-11-11 DIAGNOSIS — J30.2 SEASONAL ALLERGIC RHINITIS, UNSPECIFIED TRIGGER: ICD-10-CM

## 2024-11-11 PROCEDURE — 99214 OFFICE O/P EST MOD 30 MIN: CPT | Performed by: FAMILY MEDICINE

## 2024-11-11 RX ORDER — CETIRIZINE HYDROCHLORIDE 10 MG/1
10 TABLET ORAL DAILY
Qty: 30 TABLET | Refills: 2 | Status: SHIPPED | OUTPATIENT
Start: 2024-11-11

## 2024-11-11 RX ORDER — FLUTICASONE PROPIONATE 50 MCG
1 SPRAY, SUSPENSION (ML) NASAL DAILY
Qty: 1 EACH | Refills: 2 | Status: SHIPPED | OUTPATIENT
Start: 2024-11-11

## 2024-11-11 ASSESSMENT — PATIENT HEALTH QUESTIONNAIRE - PHQ9
8. MOVING OR SPEAKING SO SLOWLY THAT OTHER PEOPLE COULD HAVE NOTICED. OR THE OPPOSITE, BEING SO FIGETY OR RESTLESS THAT YOU HAVE BEEN MOVING AROUND A LOT MORE THAN USUAL: SEVERAL DAYS
SUM OF ALL RESPONSES TO PHQ QUESTIONS 1-9: 7
2. FEELING DOWN, DEPRESSED OR HOPELESS: SEVERAL DAYS
SUM OF ALL RESPONSES TO PHQ QUESTIONS 1-9: 7
3. TROUBLE FALLING OR STAYING ASLEEP: SEVERAL DAYS
7. TROUBLE CONCENTRATING ON THINGS, SUCH AS READING THE NEWSPAPER OR WATCHING TELEVISION: SEVERAL DAYS
10. IF YOU CHECKED OFF ANY PROBLEMS, HOW DIFFICULT HAVE THESE PROBLEMS MADE IT FOR YOU TO DO YOUR WORK, TAKE CARE OF THINGS AT HOME, OR GET ALONG WITH OTHER PEOPLE: SOMEWHAT DIFFICULT
SUM OF ALL RESPONSES TO PHQ QUESTIONS 1-9: 7
SUM OF ALL RESPONSES TO PHQ9 QUESTIONS 1 & 2: 2
9. THOUGHTS THAT YOU WOULD BE BETTER OFF DEAD, OR OF HURTING YOURSELF: NOT AT ALL
1. LITTLE INTEREST OR PLEASURE IN DOING THINGS: SEVERAL DAYS
6. FEELING BAD ABOUT YOURSELF - OR THAT YOU ARE A FAILURE OR HAVE LET YOURSELF OR YOUR FAMILY DOWN: SEVERAL DAYS
4. FEELING TIRED OR HAVING LITTLE ENERGY: SEVERAL DAYS
5. POOR APPETITE OR OVEREATING: NOT AT ALL
SUM OF ALL RESPONSES TO PHQ QUESTIONS 1-9: 7

## 2024-11-11 NOTE — PROGRESS NOTES
[facility]  [address] [place] [address]  Phone: [phone]  Fax: [phone]        Chief Complaint   Patient presents with    Sleep Problem     Pt c/o waking at night gasping for air. He states it feels like his sinuses are closing. This does not happen every night just at random times.      He is a 24 y.o. male who presents for acute visit.  Usual patient of Dr. Foote.    He is complaining of sleep issues.  Says that he feels his sinusese closing up and facial pressure when he is trying to sleep.  Will wake up gasping for air multiple times/night.  Can be particularly bad some nights where he is up multiple (5+ times).  Reports that this started ~6 months ago.    He is also complaining of increaed sinus/nasal congestion throughout the day.  There is no runny nose.  Has seasonal allergies--pollen in the spring.  He has tried a nasal spray for this--nothing helping as much.    Saw Dr. El in 7/2024 for similar.  He was referred for sleep evaluation.  He never made appointment with this group.    Prior to Visit Medications    Medication Sig Taking? Authorizing Provider   fluticasone (FLONASE) 50 MCG/ACT nasal spray 1 spray by Each Nostril route daily Yes Phill Sosa MD   cetirizine (ZYRTEC) 10 MG tablet Take 1 tablet by mouth daily Yes Phill Sosa MD   vitamin D (ERGOCALCIFEROL) 1.25 MG (03625 UT) CAPS capsule Take 1 capsule by mouth once a week Yes Amparo El MD       No Known Allergies      Reviewed PmHx, RxHx, FmHx, SocHx, AllgHx and updated and dated in the chart.      Objective:     Vitals:    11/11/24 1551   BP: 134/64   Site: Right Upper Arm   Position: Sitting   Cuff Size: Medium Adult   Pulse: 80   Resp: 16   Temp: 98 °F (36.7 °C)   TempSrc: Temporal   SpO2: 98%   Weight: 70.8 kg (156 lb)   Height: 1.778 m (5' 10\")     Physical Examination:    Physical Exam  Vitals and nursing note reviewed.   Constitutional:       General: He is not in acute distress.

## 2024-11-11 NOTE — PROGRESS NOTES
Identified pt with two pt identifiers(name and )    Chief Complaint   Patient presents with    Sleep Problem     Pt c/o waking at night gasping for air. He states it feels like his sinuses are closing. This does not happen every night just at random times.         Health Maintenance Due   Topic    Varicella vaccine (2 of 2 - 2-dose childhood series)    HPV vaccine (1 - Male 3-dose series)    Hepatitis B vaccine (1 of 3 - 19+ 3-dose series)    Flu vaccine (1)    COVID-19 Vaccine ( season)       Wt Readings from Last 3 Encounters:   24 70.8 kg (156 lb)   07/10/24 72.2 kg (159 lb 3.2 oz)   24 73.7 kg (162 lb 6.4 oz)     Temp Readings from Last 3 Encounters:   24 98 °F (36.7 °C) (Temporal)   07/10/24 98.8 °F (37.1 °C) (Temporal)   24 98.2 °F (36.8 °C) (Temporal)     BP Readings from Last 3 Encounters:   24 134/64   07/10/24 139/67   24 116/60     Pulse Readings from Last 3 Encounters:   24 80   07/10/24 75   24 94           Depression Screening:  :         2024     3:51 PM 2024    11:45 AM 2023     1:00 PM   PHQ-9 Questionaire   Little interest or pleasure in doing things 1 0 0   Feeling down, depressed, or hopeless 1 0 0   Trouble falling or staying asleep, or sleeping too much 1     Feeling tired or having little energy 1     Poor appetite or overeating 0     Feeling bad about yourself - or that you are a failure or have let yourself or your family down 1     Trouble concentrating on things, such as reading the newspaper or watching television 1     Moving or speaking so slowly that other people could have noticed. Or the opposite - being so fidgety or restless that you have been moving around a lot more than usual 1     Thoughts that you would be better off dead, or of hurting yourself in some way 0     PHQ-9 Total Score 7 0 0   If you checked off any problems, how difficult have these problems made it for you to do your work, take care of

## 2025-01-28 ENCOUNTER — OFFICE VISIT (OUTPATIENT)
Age: 25
End: 2025-01-28
Payer: COMMERCIAL

## 2025-01-28 VITALS
OXYGEN SATURATION: 99 % | SYSTOLIC BLOOD PRESSURE: 129 MMHG | TEMPERATURE: 99.1 F | HEART RATE: 80 BPM | HEIGHT: 70 IN | BODY MASS INDEX: 22.26 KG/M2 | DIASTOLIC BLOOD PRESSURE: 75 MMHG | WEIGHT: 155.5 LBS

## 2025-01-28 DIAGNOSIS — G47.30 SLEEP-DISORDERED BREATHING: Primary | ICD-10-CM

## 2025-01-28 PROCEDURE — 99203 OFFICE O/P NEW LOW 30 MIN: CPT | Performed by: SPECIALIST

## 2025-01-28 ASSESSMENT — SLEEP AND FATIGUE QUESTIONNAIRES
DO YOU GENERALLY HAVE DIFFICULTY REMEMBERING THINGS BECAUSE YOU ARE SLEEPY OR TIRED: YES, MODERATE
DO YOU HAVE DIFFICULTY VISITING YOUR FAMILY OR FRIENDS IN THEIR HOME BECAUSE YOU BECOME SLEEPY OR TIRED: YES, A LITTLE
DO YOU WORK SHIFTS: NO
DO YOU HAVE DIFFICULTY BEING AS ACTIVE AS YOU WANT TO BE IN THE MORNING BECAUSE YOU ARE SLEEPY OR TIRED: YES, MODERATE
HOW LIKELY ARE YOU TO NOD OFF OR FALL ASLEEP WHEN YOU ARE A PASSENGER IN A CAR FOR AN HOUR WITHOUT A BREAK: WOULD NEVER DOZE
HOW LIKELY ARE YOU TO NOD OFF OR FALL ASLEEP WHILE LYING DOWN TO REST IN THE AFTERNOON WHEN CIRCUMSTANCES PERMIT: MODERATE CHANCE OF DOZING
HOW LIKELY ARE YOU TO NOD OFF OR FALL ASLEEP WHILE SITTING AND READING: SLIGHT CHANCE OF DOZING
DO YOU HAVE DIFFICULTY CONCENTRATING ON THE THINGS YOU DO BECAUSE YOU ARE SLEEPY OR TIRED: YES, MODERATE
HOW LIKELY ARE YOU TO NOD OFF OR FALL ASLEEP WHILE WATCHING TV: SLIGHT CHANCE OF DOZING
HAS YOUR MOOD BEEN AFFECTED BECAUSE YOU ARE SLEEPY OR TIRED: YES, MODERATE
ESS TOTAL SCORE: 5
DO YOU GET TOO LITTLE SLEEP AT NIGHT: YES
HOW LIKELY ARE YOU TO NOD OFF OR FALL ASLEEP WHILE SITTING AND TALKING TO SOMEONE: WOULD NEVER DOZE
WHAT TIME DO YOU USUALLY GO TO BED: 16:30
ARE YOU BOTHERED BY WAKING UP TOO EARLY AND NOT BEING ABLE TO GET BACK TO SLEEP: YES
AVERAGE NUMBER OF SLEEP HOURS: 4
AVERAGE NUMBER OF SLEEP HOURS: 4
DO YOU TAKE NAPS: YES
SELECT ANY OF THE FOLLOWING BEHAVIORS OBSERVED WHILE YOU ARE ASLEEP: TWITCHING OF LEGS OR FEET
DO YOU HAVE PROBLEMS WITH FREQUENT AWAKENINGS AT NIGHT: YES
DO YOU HAVE DIFFICULTY BEING AS ACTIVE AS YOU WANT TO BE IN THE EVENING BECAUSE YOU ARE SLEEPY OR TIRED: YES, MODERATE
DO YOU GET TOO LITTLE SLEEP AT NIGHT: YES
NUMBER OF TIMES YOU WAKE PER NIGHT: 3
HOW LIKELY ARE YOU TO NOD OFF OR FALL ASLEEP WHILE SITTING INACTIVE IN A PUBLIC PLACE: WOULD NEVER DOZE
FOSQ SCORE: 14
ARE YOU BOTHERED BY WAKING UP TOO EARLY AND NOT BEING ABLE TO GET BACK TO SLEEP: YES
HOW LIKELY ARE YOU TO NOD OFF OR FALL ASLEEP IN A CAR, WHILE STOPPED FOR A FEW MINUTES IN TRAFFIC: WOULD NEVER DOZE
HOW LIKELY ARE YOU TO NOD OFF OR FALL ASLEEP WHEN YOU ARE A PASSENGER IN A CAR FOR AN HOUR WITHOUT A BREAK: WOULD NEVER DOZE
HOW LIKELY ARE YOU TO NOD OFF OR FALL ASLEEP IN A CAR, WHILE STOPPED FOR A FEW MINUTES IN TRAFFIC: WOULD NEVER DOZE
SELECT ANY OF THE FOLLOWING BEHAVIORS OBSERVED WHILE YOU ARE ASLEEP: PAUSES IN BREATHING
HAS YOUR RELATIONSHIP WITH FAMILY, FRIENDS OR WORK COLLEAGUES BEEN AFFECTED BECAUSE YOU ARE SLEEPY OR TIRED: YES, A LITTLE
HOW LIKELY ARE YOU TO NOD OFF OR FALL ASLEEP WHILE SITTING AND READING: SLIGHT CHANCE OF DOZING
HOW LIKELY ARE YOU TO NOD OFF OR FALL ASLEEP WHILE SITTING QUIETLY AFTER LUNCH WITHOUT ALCOHOL: SLIGHT CHANCE OF DOZING
DO YOU HAVE DIFFICULTY WATCHING A MOVIE OR VIDEO BECAUSE YOU BECOME SLEEPY OR TIRED: NO
HOW LIKELY ARE YOU TO NOD OFF OR FALL ASLEEP WHILE SITTING INACTIVE IN A PUBLIC PLACE: WOULD NEVER DOZE
HOW LIKELY ARE YOU TO NOD OFF OR FALL ASLEEP WHILE LYING DOWN TO REST IN THE AFTERNOON WHEN CIRCUMSTANCES PERMIT: MODERATE CHANCE OF DOZING
HOW LIKELY ARE YOU TO NOD OFF OR FALL ASLEEP WHILE SITTING QUIETLY AFTER LUNCH WITHOUT ALCOHOL: SLIGHT CHANCE OF DOZING
HOW LIKELY ARE YOU TO NOD OFF OR FALL ASLEEP WHILE SITTING AND TALKING TO SOMEONE: WOULD NEVER DOZE
HOW LIKELY ARE YOU TO NOD OFF OR FALL ASLEEP WHILE WATCHING TV: SLIGHT CHANCE OF DOZING
DO YOU HAVE DIFFICULTY OPERATING A MOTOR VEHICLE FOR LONG DISTANCES (GREATER THAN 100 MILES) BECAUSE YOU BECOME SLEEPY: NO
DO YOU HAVE DIFFICULTY OPERATING A MOTOR VEHICLE FOR SHORT DISTANCES (LESS THAN 100 MILES) BECAUSE YOU BECOME SLEEPY: NO
HOW MANY NAPS DO YOU TAKE PER WEEK: 2
HOW LONG DO YOU NAP: 45 MINUTES TO 1 HOUR
HOW LONG DO YOU NAP: 30 TO 45 MINUTES
WHAT TIME DO YOU USUALLY WAKE UP: 21:30

## 2025-01-28 NOTE — PROGRESS NOTES
[facility] [address]  [facility]  [address] [facility] [address]  Dept: [phone]           [address]  [place] [address]  Tel.  [phone]  Fax. [phone] [address]  [place] [address]  Tel.  [phone]  Fax. [phone] [address] [place] [address]  Tel.  [phone]  Fax. [phone]       Chief Complaint       Chief Complaint   Patient presents with    Sleep Problem     NP refd by Amparo El MD for sleep paralysis and gasping for breath       HPI      Jamar Villaseñor is 24 y.o. male seen for evaluation .     The patient reports he has \"trouble breathing when just about all sleep\".  He said present several times a week for the past year.  He sits up at that time and will eventually fall asleep.  This is occurred 3 times per week.      He reports episodes of more prominent when on side, \"feels like sinuses closing up    Notes pressure over forehead and face.      Does not fall asleep inappropriately during the day.  [place] Sleepiness Scale: 5     Denies vivid dreaming or nightmares, sleep talking or sleepwalking, bruxism or nocturnal incontinence, hypnagogue hallucinations, sleep paralysis or cataplexy.    The patient has not undergone diagnostic testing for the current problems.             1/28/2025    12:24 PM   Sleep Medicine   Sitting and reading 1   Watching TV 1   Sitting, inactive in a public place (e.g. a theatre or a meeting) 0   As a passenger in a car for an hour without a break 0   Lying down to rest in the afternoon when circumstances permit 2   Sitting and talking to someone 0   Sitting quietly after a lunch without alcohol 1   In a car, while stopped for a few minutes in traffic 0   [place] Sleepiness Score 5   Neck (Inches) 13.75        No Known Allergies      Current Outpatient Medications:     fluticasone (FLONASE) 50 MCG/ACT nasal spray, 1 spray by

## 2025-05-28 ENCOUNTER — OFFICE VISIT (OUTPATIENT)
Age: 25
End: 2025-05-28
Payer: COMMERCIAL

## 2025-05-28 VITALS
HEIGHT: 70 IN | RESPIRATION RATE: 16 BRPM | OXYGEN SATURATION: 99 % | SYSTOLIC BLOOD PRESSURE: 128 MMHG | WEIGHT: 157.8 LBS | BODY MASS INDEX: 22.59 KG/M2 | DIASTOLIC BLOOD PRESSURE: 76 MMHG | HEART RATE: 81 BPM | TEMPERATURE: 98.2 F

## 2025-05-28 DIAGNOSIS — J34.2 DEVIATED SEPTUM: ICD-10-CM

## 2025-05-28 DIAGNOSIS — R09.81 NASAL CONGESTION: Primary | ICD-10-CM

## 2025-05-28 PROCEDURE — 99213 OFFICE O/P EST LOW 20 MIN: CPT | Performed by: INTERNAL MEDICINE

## 2025-05-28 SDOH — ECONOMIC STABILITY: FOOD INSECURITY: WITHIN THE PAST 12 MONTHS, THE FOOD YOU BOUGHT JUST DIDN'T LAST AND YOU DIDN'T HAVE MONEY TO GET MORE.: NEVER TRUE

## 2025-05-28 SDOH — ECONOMIC STABILITY: FOOD INSECURITY: WITHIN THE PAST 12 MONTHS, YOU WORRIED THAT YOUR FOOD WOULD RUN OUT BEFORE YOU GOT MONEY TO BUY MORE.: NEVER TRUE

## 2025-05-28 SDOH — ECONOMIC STABILITY: INCOME INSECURITY: IN THE LAST 12 MONTHS, WAS THERE A TIME WHEN YOU WERE NOT ABLE TO PAY THE MORTGAGE OR RENT ON TIME?: PATIENT DECLINED

## 2025-05-28 SDOH — ECONOMIC STABILITY: TRANSPORTATION INSECURITY
IN THE PAST 12 MONTHS, HAS LACK OF TRANSPORTATION KEPT YOU FROM MEETINGS, WORK, OR FROM GETTING THINGS NEEDED FOR DAILY LIVING?: PATIENT DECLINED

## 2025-05-28 SDOH — ECONOMIC STABILITY: TRANSPORTATION INSECURITY
IN THE PAST 12 MONTHS, HAS THE LACK OF TRANSPORTATION KEPT YOU FROM MEDICAL APPOINTMENTS OR FROM GETTING MEDICATIONS?: NO

## 2025-05-28 ASSESSMENT — PATIENT HEALTH QUESTIONNAIRE - PHQ9
5. POOR APPETITE OR OVEREATING: NOT AT ALL
SUM OF ALL RESPONSES TO PHQ QUESTIONS 1-9: 4
9. THOUGHTS THAT YOU WOULD BE BETTER OFF DEAD, OR OF HURTING YOURSELF: NOT AT ALL
SUM OF ALL RESPONSES TO PHQ QUESTIONS 1-9: 4
2. FEELING DOWN, DEPRESSED OR HOPELESS: NOT AT ALL
7. TROUBLE CONCENTRATING ON THINGS, SUCH AS READING THE NEWSPAPER OR WATCHING TELEVISION: NOT AT ALL
SUM OF ALL RESPONSES TO PHQ QUESTIONS 1-9: 4
8. MOVING OR SPEAKING SO SLOWLY THAT OTHER PEOPLE COULD HAVE NOTICED. OR THE OPPOSITE - BEING SO FIDGETY OR RESTLESS THAT YOU HAVE BEEN MOVING AROUND A LOT MORE THAN USUAL: NOT AT ALL
7. TROUBLE CONCENTRATING ON THINGS, SUCH AS READING THE NEWSPAPER OR WATCHING TELEVISION: NOT AT ALL
10. IF YOU CHECKED OFF ANY PROBLEMS, HOW DIFFICULT HAVE THESE PROBLEMS MADE IT FOR YOU TO DO YOUR WORK, TAKE CARE OF THINGS AT HOME, OR GET ALONG WITH OTHER PEOPLE: SOMEWHAT DIFFICULT
6. FEELING BAD ABOUT YOURSELF - OR THAT YOU ARE A FAILURE OR HAVE LET YOURSELF OR YOUR FAMILY DOWN: NOT AT ALL
2. FEELING DOWN, DEPRESSED OR HOPELESS: NOT AT ALL
1. LITTLE INTEREST OR PLEASURE IN DOING THINGS: NOT AT ALL
8. MOVING OR SPEAKING SO SLOWLY THAT OTHER PEOPLE COULD HAVE NOTICED. OR THE OPPOSITE, BEING SO FIGETY OR RESTLESS THAT YOU HAVE BEEN MOVING AROUND A LOT MORE THAN USUAL: NOT AT ALL
6. FEELING BAD ABOUT YOURSELF - OR THAT YOU ARE A FAILURE OR HAVE LET YOURSELF OR YOUR FAMILY DOWN: NOT AT ALL
5. POOR APPETITE OR OVEREATING: NOT AT ALL
SUM OF ALL RESPONSES TO PHQ QUESTIONS 1-9: 4
4. FEELING TIRED OR HAVING LITTLE ENERGY: SEVERAL DAYS
3. TROUBLE FALLING OR STAYING ASLEEP: NEARLY EVERY DAY
3. TROUBLE FALLING OR STAYING ASLEEP: NEARLY EVERY DAY
9. THOUGHTS THAT YOU WOULD BE BETTER OFF DEAD, OR OF HURTING YOURSELF: NOT AT ALL
1. LITTLE INTEREST OR PLEASURE IN DOING THINGS: NOT AT ALL
SUM OF ALL RESPONSES TO PHQ QUESTIONS 1-9: 4
4. FEELING TIRED OR HAVING LITTLE ENERGY: SEVERAL DAYS
10. IF YOU CHECKED OFF ANY PROBLEMS, HOW DIFFICULT HAVE THESE PROBLEMS MADE IT FOR YOU TO DO YOUR WORK, TAKE CARE OF THINGS AT HOME, OR GET ALONG WITH OTHER PEOPLE: SOMEWHAT DIFFICULT

## 2025-05-28 NOTE — PROGRESS NOTES
Identified pt with two pt identifiers(name and )    Chief Complaint   Patient presents with    Sleep Problem     Pt has been having sleeping problems for about a year and the 1-2 weeks it has been getting worse        Health Maintenance Due   Topic    Varicella vaccine (2 of 2 - 2-dose childhood series)    HPV vaccine (1 - Male 3-dose series)    Hepatitis B vaccine (1 of 3 - 19+ 3-dose series)    COVID-19 Vaccine ( season)       Wt Readings from Last 3 Encounters:   25 71.6 kg (157 lb 12.8 oz)   25 70.5 kg (155 lb 8 oz)   24 70.8 kg (156 lb)     Temp Readings from Last 3 Encounters:   25 98.2 °F (36.8 °C) (Temporal)   25 99.1 °F (37.3 °C) (Temporal)   24 98 °F (36.7 °C) (Temporal)     BP Readings from Last 3 Encounters:   25 128/76   25 129/75   24 134/64     Pulse Readings from Last 3 Encounters:   25 81   25 80   24 80           Depression Screening:  :         2025    10:11 AM 2024     3:51 PM 2024    11:45 AM 2023     1:00 PM   PHQ-9 Questionaire   Little interest or pleasure in doing things 0 1 0 0   Feeling down, depressed, or hopeless 0 1 0 0   Trouble falling or staying asleep, or sleeping too much 3 1     Feeling tired or having little energy 1 1     Poor appetite or overeating 0 0     Feeling bad about yourself - or that you are a failure or have let yourself or your family down 0 1     Trouble concentrating on things, such as reading the newspaper or watching television 0 1     Moving or speaking so slowly that other people could have noticed. Or the opposite - being so fidgety or restless that you have been moving around a lot more than usual 0 1     Thoughts that you would be better off dead, or of hurting yourself in some way 0 0     PHQ-9 Total Score 4  7 0 0   If you checked off any problems, how difficult have these problems made it for you to do your work, take care of things at home, or get 
sprays have worked well for him.     Sleep medicine felt that his symptoms seem to be ENT rather than sleep disorder.     He is here for evaluation and also referral to ENT.     Review of Systems   HENT:  Positive for congestion.    All other systems reviewed and are negative.    HISTORICAL  PMH, PSH, FHX, SOCHX, ALLERGIES and MEDS were reviewed and updated today.    OBJECTIVE  Visit Vitals  /76 (BP Site: Right Upper Arm, Patient Position: Sitting, BP Cuff Size: Medium Adult)   Pulse 81   Temp 98.2 °F (36.8 °C) (Temporal)   Resp 16   Ht 1.778 m (5' 10\")   Wt 71.6 kg (157 lb 12.8 oz)   SpO2 99%   BMI 22.64 kg/m²       Physical Exam  Vitals and nursing note reviewed.   HENT:      Head: Normocephalic and atraumatic.      Comments: Mild deviated septum       Nose:      Comments: Mildly deviated nasal septum (right side)   Cardiovascular:      Rate and Rhythm: Normal rate and regular rhythm.      Pulses: Normal pulses.      Heart sounds: Normal heart sounds.   Pulmonary:      Effort: Pulmonary effort is normal.      Breath sounds: Normal breath sounds.   Skin:     General: Skin is warm.      Capillary Refill: Capillary refill takes less than 2 seconds.   Neurological:      General: No focal deficit present.      Mental Status: He is oriented to person, place, and time. Mental status is at baseline.       No results found for any visits on 05/28/25.    Ernestina Foote MD  [facility]   05/28/25